# Patient Record
Sex: FEMALE | Race: OTHER
[De-identification: names, ages, dates, MRNs, and addresses within clinical notes are randomized per-mention and may not be internally consistent; named-entity substitution may affect disease eponyms.]

---

## 2019-07-23 ENCOUNTER — HOSPITAL ENCOUNTER (EMERGENCY)
Dept: HOSPITAL 72 - EMR | Age: 54
Discharge: HOME | End: 2019-07-23
Payer: MEDICAID

## 2019-07-23 VITALS — SYSTOLIC BLOOD PRESSURE: 125 MMHG | DIASTOLIC BLOOD PRESSURE: 76 MMHG

## 2019-07-23 VITALS — WEIGHT: 160 LBS | BODY MASS INDEX: 31.41 KG/M2 | HEIGHT: 60 IN

## 2019-07-23 VITALS — SYSTOLIC BLOOD PRESSURE: 117 MMHG | DIASTOLIC BLOOD PRESSURE: 71 MMHG

## 2019-07-23 VITALS — DIASTOLIC BLOOD PRESSURE: 76 MMHG | SYSTOLIC BLOOD PRESSURE: 125 MMHG

## 2019-07-23 DIAGNOSIS — R50.9: ICD-10-CM

## 2019-07-23 DIAGNOSIS — B34.9: Primary | ICD-10-CM

## 2019-07-23 LAB
ADD MANUAL DIFF: NO
ALBUMIN SERPL-MCNC: 4.2 G/DL (ref 3.4–5)
ALBUMIN/GLOB SERPL: 1.1 {RATIO} (ref 1–2.7)
ALP SERPL-CCNC: 133 U/L (ref 46–116)
ALT SERPL-CCNC: 26 U/L (ref 12–78)
ANION GAP SERPL CALC-SCNC: 10 MMOL/L (ref 5–15)
AST SERPL-CCNC: 29 U/L (ref 15–37)
BASOPHILS NFR BLD AUTO: 1.2 % (ref 0–2)
BILIRUB SERPL-MCNC: 0.5 MG/DL (ref 0.2–1)
BUN SERPL-MCNC: 15 MG/DL (ref 7–18)
CALCIUM SERPL-MCNC: 9.3 MG/DL (ref 8.5–10.1)
CHLORIDE SERPL-SCNC: 102 MMOL/L (ref 98–107)
CO2 SERPL-SCNC: 28 MMOL/L (ref 21–32)
CREAT SERPL-MCNC: 0.8 MG/DL (ref 0.55–1.3)
EOSINOPHIL NFR BLD AUTO: 0.1 % (ref 0–3)
ERYTHROCYTE [DISTWIDTH] IN BLOOD BY AUTOMATED COUNT: 11.4 % (ref 11.6–14.8)
GLOBULIN SER-MCNC: 3.9 G/DL
HCT VFR BLD CALC: 40.8 % (ref 37–47)
HGB BLD-MCNC: 13.6 G/DL (ref 12–16)
LYMPHOCYTES NFR BLD AUTO: 13.3 % (ref 20–45)
MCV RBC AUTO: 92 FL (ref 80–99)
MONOCYTES NFR BLD AUTO: 12.5 % (ref 1–10)
NEUTROPHILS NFR BLD AUTO: 72.9 % (ref 45–75)
PLATELET # BLD: 125 K/UL (ref 150–450)
POTASSIUM SERPL-SCNC: 3.7 MMOL/L (ref 3.5–5.1)
RBC # BLD AUTO: 4.43 M/UL (ref 4.2–5.4)
SODIUM SERPL-SCNC: 140 MMOL/L (ref 136–145)
WBC # BLD AUTO: 7.4 K/UL (ref 4.8–10.8)

## 2019-07-23 PROCEDURE — 99284 EMERGENCY DEPT VISIT MOD MDM: CPT

## 2019-07-23 PROCEDURE — 96374 THER/PROPH/DIAG INJ IV PUSH: CPT

## 2019-07-23 PROCEDURE — 80053 COMPREHEN METABOLIC PANEL: CPT

## 2019-07-23 PROCEDURE — 96361 HYDRATE IV INFUSION ADD-ON: CPT

## 2019-07-23 PROCEDURE — 87040 BLOOD CULTURE FOR BACTERIA: CPT

## 2019-07-23 PROCEDURE — 85025 COMPLETE CBC W/AUTO DIFF WBC: CPT

## 2019-07-23 PROCEDURE — 36415 COLL VENOUS BLD VENIPUNCTURE: CPT

## 2019-07-23 PROCEDURE — 71045 X-RAY EXAM CHEST 1 VIEW: CPT

## 2019-07-23 NOTE — EMERGENCY ROOM REPORT
History of Present Illness


General


Chief Complaint:  Fever


Source:  Patient





Present Illness


HPI


54-year-old female no past medical history, presents with cough, congestion, 

fever/chills x1 day, body aches, no nausea, no vomiting, no abdominal pain, she 

endorses some sputum with coughing, patient presents for evaluation, she denies 

any aggravating or alleviating factors, she does endorse some abdominal pain 

with coughing that is worsened by coughing, alleviated by not coughing.


Allergies:  


Coded Allergies:  


     No Known Allergies (Unverified , 7/23/19)





Patient History


Past Medical History:  see triage record


Pregnant Now:  No


Reviewed Nursing Documentation:  PMH: Agreed; PSxH: Agreed





Nursing Documentation-PMH


Past Medical History:  No Stated History





Review of Systems


Constitutional:  Reports: chills, fever


Eye:  Denies: blurred vision, double vision


ENT:  Denies: throat pain, nasal discharge


Respiratory:  Reports: cough; Denies: shortness of breath


Cardiovascular:  Denies: chest pain, palpitations


Gastrointestinal:  Reports: abdominal pain; Denies: diarrhea, nausea, vomiting


Genitourinary:  Denies: dysuria, pain


Musculoskeletal:  Denies: back pain, muscle pain


Skin:  Denies: rash, lesions


Neurological:  Denies: headache, focal weakness


Hematologic/Lymphatic:  Denies: easy bleeding, easy bruising


All Other Systems:  negative except mentioned in HPI





Physical Exam





Vital Signs








  Date Time  Temp Pulse Resp B/P (MAP) Pulse Ox O2 Delivery O2 Flow Rate FiO2


 


7/23/19 20:40 102.9 104 14 117/71 (86) 97 Room Air  








Sp02 EP Interpretation:  reviewed, normal


General Appearance:  well appearing, no apparent distress, alert


Head:  normocephalic, atraumatic


Eyes:  bilateral eye PERRL, bilateral eye EOMI


ENT:  uvula midline, moist mucus membranes


Neck:  supple, thyroid normal, supple/symm/no masses


Respiratory:  lungs clear, no respiratory distress, no retraction, no accessory 

muscle use


Cardiovascular #1:  normal peripheral pulses, regular rate, rhythm, no edema, 

no gallop, no murmur


Gastrointestinal:  non tender, soft, no guarding, no rebound


Musculoskeletal:  normal inspection


Neurologic:  alert, oriented x3


Psychiatric:  mood/affect normal


Skin:  no rash, warm/dry





Medical Decision Making


Diagnostic Impression:  


 Primary Impression:  


 Fever


 Additional Impression:  


 Viral syndrome


ER Course


54-year-old female presents with cough, congestion, body aches, most consistent 

with a viral syndrome, patient with no obvious risk factors, no indication for 

testing for the flu, patient given antibiotics, initially presumed pneumonia 

however patient had a negative chest x-ray, on the differential includes 

pneumonia, viral syndrome, flu, sepsis, patient is not tachycardic on exam, no 

elevated white count, will disposition patient home with return precautions, 

patient counseled that if she worsens to return to the ED





Laboratory Tests








Test


  7/23/19


21:20


 


White Blood Count


  7.4 K/UL


(4.8-10.8)


 


Red Blood Count


  4.43 M/UL


(4.20-5.40)


 


Hemoglobin


  13.6 G/DL


(12.0-16.0)


 


Hematocrit


  40.8 %


(37.0-47.0)


 


Mean Corpuscular Volume 92 FL (80-99)  


 


Mean Corpuscular Hemoglobin


  30.8 PG


(27.0-31.0)


 


Mean Corpuscular Hemoglobin


Concent 33.4 G/DL


(32.0-36.0)


 


Red Cell Distribution Width


  11.4 %


(11.6-14.8)  L


 


Platelet Count


  125 K/UL


(150-450)  L


 


Mean Platelet Volume


  12.0 FL


(6.5-10.1)  H


 


Neutrophils (%) (Auto)


  72.9 %


(45.0-75.0)


 


Lymphocytes (%) (Auto)


  13.3 %


(20.0-45.0)  L


 


Monocytes (%) (Auto)


  12.5 %


(1.0-10.0)  H


 


Eosinophils (%) (Auto)


  0.1 %


(0.0-3.0)


 


Basophils (%) (Auto)


  1.2 %


(0.0-2.0)


 


Sodium Level


  140 MMOL/L


(136-145)


 


Potassium Level


  3.7 MMOL/L


(3.5-5.1)


 


Chloride Level


  102 MMOL/L


()


 


Carbon Dioxide Level


  28 MMOL/L


(21-32)


 


Anion Gap


  10 mmol/L


(5-15)


 


Blood Urea Nitrogen


  15 mg/dL


(7-18)


 


Creatinine


  0.8 MG/DL


(0.55-1.30)


 


Estimate Glomerular


Filtration Rate > 60 mL/min


(>60)


 


Glucose Level


  108 MG/DL


()  H


 


Calcium Level


  9.3 MG/DL


(8.5-10.1)


 


Total Bilirubin


  0.5 MG/DL


(0.2-1.0)


 


Aspartate Amino Transferase


(AST) 29 U/L (15-37)


 


 


Alanine Aminotransferase (ALT)


  26 U/L (12-78)


 


 


Alkaline Phosphatase


  133 U/L


()  H


 


Total Protein


  8.1 G/DL


(6.4-8.2)


 


Albumin


  4.2 G/DL


(3.4-5.0)


 


Globulin 3.9 g/dL  


 


Albumin/Globulin Ratio 1.1 (1.0-2.7)  








Chest X-Ray Diagnostic Results


Chest X-Ray Diagnostic Results :  


   Chest X-Ray Ordered:  Yes


   # of Views/Limited/Complete:  1 View


   Indication:  Other - cough


   EP Interpretation:  Yes


   Interpretation:  no consolidation, no effusion, no pneumothorax, no acute 

cardiopulmonary disease


   Impression:  No acute disease


   Electronically Signed by:  Papa Edmondson MD





Last Vital Signs








  Date Time  Temp Pulse Resp B/P (MAP) Pulse Ox O2 Delivery O2 Flow Rate FiO2


 


7/23/19 20:55 103.1 101 14 117/71 97 Room Air  








Disposition:  HOME, SELF-CARE


Condition:  Improved


Scripts


No Active Prescriptions or Reported Meds


Referrals:  


[facility]





[facility]


Patient Instructions:  Fever, Adult, Easy-to-Read, Influenza, Adult, Easy-to-

Read





Additional Instructions:  


The patient was provided with discharge instructions, notified to follow-up 

with a primary care doctor and or specialist in the next 24-48 hours, and to 

return to the ED if they have worsening of their symptoms. 





Please note that this report is being documented using DRAGON technology.


This can lead to erroneous entry secondary to incorrect interpretation by the 

dictating instrument.











Papa Edmondson MD Jul 23, 2019 21:50

## 2019-07-24 NOTE — DIAGNOSTIC IMAGING REPORT
Indication: Cough

 

Comparison:  None

 

A single view chest radiograph was obtained.

 

Findings:

 

Cardiomediastinal appearance is within normal limits for age. The lungs are clear.

Pulmonary vascularity is appropriate. The diaphragmatic contour is smooth and

costophrenic angles are sharp. No pleural effusions are identified. The bones are

unremarkable.

 

Impression: No acute findings

## 2020-02-12 ENCOUNTER — HOSPITAL ENCOUNTER (EMERGENCY)
Dept: HOSPITAL 72 - EMR | Age: 55
Discharge: HOME | End: 2020-02-12
Payer: MEDICAID

## 2020-02-12 VITALS — BODY MASS INDEX: 32.98 KG/M2 | WEIGHT: 168 LBS | HEIGHT: 60 IN

## 2020-02-12 VITALS — DIASTOLIC BLOOD PRESSURE: 82 MMHG | SYSTOLIC BLOOD PRESSURE: 121 MMHG

## 2020-02-12 VITALS — SYSTOLIC BLOOD PRESSURE: 121 MMHG | DIASTOLIC BLOOD PRESSURE: 82 MMHG

## 2020-02-12 DIAGNOSIS — K52.9: Primary | ICD-10-CM

## 2020-02-12 PROCEDURE — 99282 EMERGENCY DEPT VISIT SF MDM: CPT

## 2020-02-12 NOTE — NUR
ED Nurse Note:



Pt walked into ED from home for c/o headache, abdominal pain and n/v x2 days. 
Pt is aaox4, ambulatory with steady gait, no cardiac or respiratory distress 
noted.

## 2020-02-14 NOTE — EMERGENCY ROOM REPORT
History of Present Illness


General


Chief Complaint:  Nausea, Vomiting, and Diarrhea


Source:  Patient





Present Illness


Allergies:  


Coded Allergies:  


     No Known Allergies (Unverified , 19)





Patient History


Last Menstrual Period:  na


Pregnant Now:  No


:  6


Para:  6





Nursing Documentation-PMH


Past Medical History:  No Stated History





Physical Exam





Vital Signs








  Date Time  Temp Pulse Resp B/P (MAP) Pulse Ox O2 Delivery O2 Flow Rate FiO2


 


20 20:04 98.6 92 19 121/82 (95) 94 Room Air  











Medical Decision Making


Diagnostic Impression:  


 Primary Impression:  


 Gastroenteritis


ER Course


Hospital Course 


54-year-old female presents with headache with vomiting, diarrhea 





differential diagnosis: gastritis, SBO, cholecystits, gastroenteritis  





Clinical course


Patient placed in chair.  After initial history physical exam reveals female in 

no acute distress.  Head and neck exam unremarkable.  Abdomen soft.  No 

guarding or rebound.  Good capillary refill.





Vitals stable.  Resting comfortably.  No active vomiting.  Daughter has similar 

symptoms.  Likely viral.  Course is self-limited.  Discussed with mother and 

patient.  I do not believe work-up and IV required at this time.  They agree.  

Will discharge home with medications.  Safe for discharge for close outpatient 

follow-up.  I will provide referrals





I feel this is a highly complex case requiring extensive working including EKG/

Rhythm strip, Xray/CT/US, Blood/urine lab work, repeat exams while in ED, and 

administration of strong opiates/narcotics for pain control, admission to 

hospital or close patient follow up.  





Diagnosis - gastroenteritis 





Stable and discharged to home with prescriptions for Tylenol, Bentyl, Zofran 

Zantac.  Followup with PMD.  Return to ED if symptoms recur or worsen





Last Vital Signs








  Date Time  Temp Pulse Resp B/P (MAP) Pulse Ox O2 Delivery O2 Flow Rate FiO2


 


20 20:50 98.6 85 19 121/82 94 Room Air  








Disposition:  HOME, SELF-CARE


Condition:  Stable


Scripts


Acetaminophen* (TYLENOL EXTRA STRENGTH*) 500 Mg Tablet


500 MG ORAL Q8H PRN for Prn Headache/Temp > 101, #30 TAB 0 Refills


   Prov: Sarkis Mayberry MD         20 


Dicyclomine Hcl* (DICYCLOMINE HCL*) 10 Mg Capsule


10 MG ORAL QID, #20 CAP


   Prov: Sarkis Mayberry MD         20 


Ondansetron Odt* (ZOFRAN ODT*) 4 Mg Tab.rapdis


4 MG BC EVERY 6 HOURS PRN for Nausea & Vomiting, #10 TAB 0 Refills


   Prov: Sarkis Mayberry MD         20 


Ranitidine Hcl* (ZANTAC*) 150 Mg Tablet


150 MG ORAL TWICE A DAY, #30 TAB


   Prov: Sarkis Mayberry MD         20


Referrals:  


NON PHYSICIAN (PCP)











[facility]


Patient Instructions:  Viral Gastroenteritis, Adult, Easy-to-Read











Sarkis Mayberry MD 2020 07:22

## 2020-11-14 ENCOUNTER — HOSPITAL ENCOUNTER (EMERGENCY)
Dept: HOSPITAL 72 - EMR | Age: 55
Discharge: HOME | End: 2020-11-14
Payer: MEDICAID

## 2020-11-14 VITALS — HEIGHT: 64 IN | BODY MASS INDEX: 31.58 KG/M2 | WEIGHT: 185 LBS

## 2020-11-14 VITALS — DIASTOLIC BLOOD PRESSURE: 74 MMHG | SYSTOLIC BLOOD PRESSURE: 128 MMHG

## 2020-11-14 VITALS — DIASTOLIC BLOOD PRESSURE: 86 MMHG | SYSTOLIC BLOOD PRESSURE: 138 MMHG
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
## 2020-11-14 DIAGNOSIS — S52.612A: ICD-10-CM
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
## 2020-11-14 DIAGNOSIS — S52.572A: Primary | ICD-10-CM

## 2020-11-14 PROCEDURE — 73110 X-RAY EXAM OF WRIST: CPT

## 2020-11-14 PROCEDURE — 96372 THER/PROPH/DIAG INJ SC/IM: CPT

## 2020-11-14 PROCEDURE — 99283 EMERGENCY DEPT VISIT LOW MDM: CPT

## 2020-11-14 PROCEDURE — 29105 APPLICATION LONG ARM SPLINT: CPT

## 2020-11-14 NOTE — EMERGENCY ROOM REPORT
History of Present Illness


General


Chief Complaint:  Upper Extremity Injury


Source:  Patient





Present Illness


HPI


55-year-old female here with left hand pain.  The patient was visiting a friend 

in [place] 4 days ago when she suffered a mechanical fall landing on her 

outstretched left hand suffering a fracture of her left wrist at the distal 

radius.  A volar splint was placed and patient was given information to follow-

up with orthopedic surgery.  However patient returned to [place] and says 

that over the past 24 hours she has had severe worsening pain of the left wrist 

and now with new inability to move the left fingers due to the pain.  Also noted

that the left fingers have become much more swollen as well.  Patient never had 

these symptoms previously.  Never struck her head or had loss of consciousness 

when she fell.  Has been taking oxycodone for the pain without much relief.  Now

complaining of some numbness in the left fingers as well.


Allergies:  


Coded Allergies:  


     No Known Allergies (Unverified , 7/23/19)





COVID-19 Screening


Contact w/high risk pt:  No


Experienced COVID-19 symptoms?:  No


COVID-19 Testing performed PTA:  No





Patient History


Last Menstrual Period:  UNK


Pregnant Now:  No





Nursing Documentation-[facility]


Past Medical History:  No Stated History





Review of Systems


All Other Systems:  negative except mentioned in HPI





Physical Exam





Vital Signs








  Date Time  Temp Pulse Resp B/P (MAP) Pulse Ox O2 Delivery O2 Flow Rate FiO2


 


11/14/20 22:06 98.1 66 16 123/82 (96) 98 Room Air  








Sp02 EP Interpretation:  reviewed, normal


General Appearance:  alert, non-toxic, moderate distress


Head:  normocephalic, atraumatic


Eyes:  bilateral eye normal inspection, bilateral eye PERRL


ENT:  hearing grossly normal, normal pharynx, no angioedema, normal voice


Neck:  full range of motion, supple/symm/no masses


Respiratory:  chest non-tender, lungs clear, normal breath sounds, speaking full

sentences


Cardiovascular #1:  regular rate, rhythm, no edema


Cardiovascular #2:  2+ carotid (R), 2+ carotid (L), 2+ radial (R), 2+ radial 

(L), 2+ dorsalis pedis (R), 2+ dorsalis pedis (L)


Gastrointestinal:  normal bowel sounds, non tender, soft, non-distended, no 

guarding, no rebound


Rectal:  deferred


Genitourinary:  normal inspection, no CVA tenderness


Musculoskeletal:  back normal, normal range of motion, gait/station normal, 

other - In the left forearm splint.  Left fingers are notably swollen with 

normal capillary refill.  Unable to flex or extend fingers secondary to pain


Neurologic:  alert, motor strength/tone normal, oriented x3, sensory intact, 

responsive, speech normal


Psychiatric:  judgement/insight normal, memory normal, mood/affect normal, no 

suicidal/homicidal ideation


Reflexes:  3+ bicep (R), 3+ bicep (L), 3+ tricep (R), 3+ tricep (L), 3+ knee 

(R), 3+ knee (L)


Lymphatic:  no adenopathy





Medical Decision Making


Diagnostic Impression:  


   Primary Impression:  


   Injury of upper extremity


   Additional Impressions:  


   Distal radius fracture


   Fracture of ulnar styloid


ER Course


EXAM:


  XR Left Wrist Complete, 3 or More Views


 


CLINICAL HISTORY:


  PAIN


 


TECHNIQUE:


  Frontal, lateral and oblique views of the left wrist.


 


COMPARISON:


  None.


 


FINDINGS:


  Bones/joints:  There is a displaced, intra-articular fracture of the 


distal radial metaphysis.  There is approximately 7 mm of radial 


displacement as well as 3 mm dorsal displacement.  There is a displaced 


ulnar styloid fracture.  There is mild widening of the scapholunate 


interval, measuring 3.5 mm.  No dislocation.


  Soft tissues:  Soft tissue edema of the distal forearm is noted.  No 


radiopaque foreign body.


 


IMPRESSION:     


1.  Displaced, intra-articular distal radial fracture.


2.  Displaced ulnar styloid fracture.


3.  Widening of the scapholunate suggest dissociation.


 





Splint note: Sugar tong splint placed on the left upper extremity and then 

patient given a left arm sling.  She was neurovascular intact before and after 

the splint was placed.  The splint was placed by the [facility] and examined by 

myself.





55-year-old female here with left wrist pain.  Patient has known fracture of the

left wrist that she suffered 4 days ago.  She had a sugar tong splint placed at 

the other hospital in [place].  She was in a severe amount of pain in the 

emergency department and was given 1 mg of Dilaudid intramuscularly with some 

resolution of her pain.  The old splint was removed and was noted to be 

extremely tight around the left upper extremity.  When we remove the splint the 

patient said that she had some relief of her pain.  X-ray redemonstrated the 

left wrist fracture.  The patient had a photograph of the x-ray on her phone and

it did not appear to have any further malalignment than the previous x-ray.  A 

new sugar tong splint was placed in the left upper extremity and patient was 

given an arm sling as described above.  She was given information to follow-up 

with orthopedic surgery.  Given a prescription for Norco and told to come back 

to the emergency department if she has any more severe worsening of her pain.  

She is expressed understanding and was discharged.





Last Vital Signs








  Date Time  Temp Pulse Resp B/P (MAP) Pulse Ox O2 Delivery O2 Flow Rate FiO2


 


11/14/20 22:06 98.1 66 16 123/82 (96) 98 Room Air  








Scripts


Hydrocodone Bit/Acetaminophen 5-325* (NORCO 5-325 TABLET*) 1 Each Tablet


1 TAB ORAL Q4H PRN for For Pain, #10 TAB


   Prov: Eric Lau M.D.         11/14/20


Referrals:  


NOT CHOSEN IPA/MD,REFERRING (PCP)











Eric Lau M.D.                Nov 14, 2020 22:35

## 2020-11-14 NOTE — NUR
ER DISCHARGE NOTE:

Patient is cleared to be discharged per ERMD, pt is aox4, on room air, with 
stable vital signs. Splint on left wrist placed with arm sling, no discomfort 
expressed from pt. pt was given dc and prescription instructions with 
instruction to f/u with PMD in 1 week. pt was able to verbalize understanding, 
pt id band. pt is able to ambulate with steady gait. pt took all belongings.

## 2020-11-14 NOTE — NUR
ED Nurse Note: pt walked into ED from home c/o arm pain 10/10 s/p cast 
placement after falling 4 days ago, pt was discharged from hospital in [place] 
with oxycodone. Today in the AM pt noticed fingers got more swollen and pain 
was severe not controlled by oxycodone.